# Patient Record
Sex: FEMALE | Race: WHITE | NOT HISPANIC OR LATINO
[De-identification: names, ages, dates, MRNs, and addresses within clinical notes are randomized per-mention and may not be internally consistent; named-entity substitution may affect disease eponyms.]

---

## 2023-04-26 ENCOUNTER — APPOINTMENT (OUTPATIENT)
Dept: ORTHOPEDIC SURGERY | Facility: CLINIC | Age: 48
End: 2023-04-26
Payer: COMMERCIAL

## 2023-04-26 VITALS — HEIGHT: 62 IN | WEIGHT: 98 LBS | BODY MASS INDEX: 18.03 KG/M2

## 2023-04-26 DIAGNOSIS — Z78.9 OTHER SPECIFIED HEALTH STATUS: ICD-10-CM

## 2023-04-26 DIAGNOSIS — S39.012A STRAIN OF MUSCLE, FASCIA AND TENDON OF LOWER BACK, INITIAL ENCOUNTER: ICD-10-CM

## 2023-04-26 PROBLEM — Z00.00 ENCOUNTER FOR PREVENTIVE HEALTH EXAMINATION: Status: ACTIVE | Noted: 2023-04-26

## 2023-04-26 PROCEDURE — 99203 OFFICE O/P NEW LOW 30 MIN: CPT

## 2023-04-26 RX ORDER — CYCLOBENZAPRINE HYDROCHLORIDE 5 MG/1
5 TABLET, FILM COATED ORAL
Qty: 30 | Refills: 1 | Status: ACTIVE | COMMUNITY
Start: 2023-04-26 | End: 1900-01-01

## 2023-04-26 RX ORDER — METHYLPREDNISOLONE 4 MG/1
4 TABLET ORAL
Qty: 1 | Refills: 0 | Status: ACTIVE | COMMUNITY
Start: 2023-04-26 | End: 1900-01-01

## 2023-04-26 NOTE — DISCUSSION/SUMMARY
[de-identified] : 46 y/o F presenting with acute lumbar myofascial strain.\par I am prescribing the patient MDP for pain relief. Titration schedule provided. Counseled patient to complete oral steroid taper before resuming treatment with nsaid. Patient was provided with a referral for lumbar physical therapy to work on stretching, strengthening and range of motion. MRI request at next visit if symptoms do not improve from medications and PT. F/U 2 wks. \par \par Prior to appointment and during encounter with patient extensive medical records were reviewed including but not limited to, hospital records, outpatient records, imaging results, and lab data.During this appointment the patient was examined, diagnoses were discussed and explained in a face to face manner. In addition extensive time was spent reviewing aforementioned diagnostic studies. Counseling including abnormal image results, differential diagnoses, treatment options, risk and benefits, lifestyle changes, current condition, and current medications was performed. Patient's comments, questions, and concerns were addressed and patient verbalized understanding. Based on this patient's presentation at our office, which is an orthopedic spine surgeon's office, this patient inherently / intrinsically has a risk, however minute, of developing issues such as Cauda equina syndrome, bowel and bladder changes, or progression of motor or neurological deficits such as paralysis which may be permanent.\par \par BRITT ANNE Acting as a Scribe for Dr. Heather DWYER, Britt Anne, attest that this documentation has been prepared under the direction and in the presence of Provider Raciel Feldman MD.

## 2023-04-26 NOTE — HISTORY OF PRESENT ILLNESS
[de-identified] : 4/26/23: 46 y/o F presenting for an initial evaluation of lumbar spine. Patient reports years of controlled low back pain, but significant increased severity starting yesterday. No trauma. She reports acute onset of pain after bending down to water flowers. She complains of severe pain focal to tho mid/low back and RT buttock/hip region. No radicular LE symptoms, paresthesias, or changes in LE strength. Mild relief from Advil. No hx of treatments for L Spine in the past. She reports last back pain flare up apporx 8 yrs ago. General medical health is good. No constitutional symptoms. No bowel/bladder dysfunction. \par  [FreeTextEntry5] : The patient is a 47  year old  who presents today complaining of lower back pain.\par Date of Injury/Onset: 4/25/23 in the morning\par Pain:    At Rest: 5/10  \par With Activity:  10/10  \par Mechanism of injury: Pt reports watering the plants on the balcony when all of a sudden felt a tightness and pain  to lower back that later on radiated to R side towards her R hip. Also reports that has had issues with lower back for a long time.\par Quality of symptoms: Soreness, Throbbing\par Improves with: Hot & Cold, Advil - no relief\par Worse with: Walking, seating, driving\par Prior treatment/ Imaging: None\par Out of work: ;Since: None\par School/Sport/Position/Occupation: [employer]\par Additional Information: None\par

## 2023-04-26 NOTE — PHYSICAL EXAM
[Flexion] : flexion [Extension] : extension [Normal Coordination] : normal coordination [Normal DTR UE/LE] : normal DTR UE/LE  [Normal Sensation] : normal sensation [Normal Mood and Affect] : normal mood and affect [Orientated] : orientated [Able to Communicate] : able to communicate [Normal Skin] : normal skin [No Rash] : no rash [No Ulcers] : no ulcers [No Lesions] : no lesions [No obvious lymphadenopathy in areas examined] : no obvious lymphadenopathy in areas examined [Well Developed] : well developed [Peripheral vascular exam is grossly normal] : peripheral vascular exam is grossly normal [No Respiratory Distress] : no respiratory distress [de-identified] : Constitutional:\par - General Appearance:\par Unremarkable\par Body Habitus\par Well Developed\par Well Nourished\par Body Habitus\par No Deformities\par Well Groomed\par Ability To communicate:\par Normal\par Neurologic:\par Global sensation is intact to upper and lower extremities. See examination of Neck and/or Spine\par for exceptions.\par Orientation to Time, Place and Person is: Normal\par Mood And Affect is Normal\par Skin:\par - Head/Face, Right Upper/Lower Extremity, Left Upper/Lower Extremity: Normal\par See Examination of Neck and/or Spine for exceptions\par Cardiovascular:\par Peripheral Cardiovascular System is Normal\par Palpation of Lymph Nodes:\par Normal Palpation of lymph nodes in: Axilla, Cervical, Inguinal\par Abnormal Palpation of lymph nodes in: None  [] : non-antalgic [FreeTextEntry9] : Limited ROM due to pain and stiffness